# Patient Record
Sex: FEMALE | Race: AMERICAN INDIAN OR ALASKA NATIVE
[De-identification: names, ages, dates, MRNs, and addresses within clinical notes are randomized per-mention and may not be internally consistent; named-entity substitution may affect disease eponyms.]

---

## 2018-06-23 ENCOUNTER — HOSPITAL ENCOUNTER (INPATIENT)
Dept: HOSPITAL 42 - ED | Age: 43
LOS: 3 days | Discharge: HOME | DRG: 392 | End: 2018-06-26
Attending: FAMILY MEDICINE | Admitting: FAMILY MEDICINE
Payer: COMMERCIAL

## 2018-06-23 VITALS — BODY MASS INDEX: 36.6 KG/M2

## 2018-06-23 DIAGNOSIS — K58.9: ICD-10-CM

## 2018-06-23 DIAGNOSIS — D64.9: ICD-10-CM

## 2018-06-23 DIAGNOSIS — J45.901: ICD-10-CM

## 2018-06-23 DIAGNOSIS — E86.0: ICD-10-CM

## 2018-06-23 DIAGNOSIS — F32.9: ICD-10-CM

## 2018-06-23 DIAGNOSIS — K21.9: ICD-10-CM

## 2018-06-23 DIAGNOSIS — A08.4: Primary | ICD-10-CM

## 2018-06-23 DIAGNOSIS — F41.9: ICD-10-CM

## 2018-06-23 DIAGNOSIS — F17.210: ICD-10-CM

## 2018-06-23 DIAGNOSIS — J44.9: ICD-10-CM

## 2018-06-23 LAB
ALBUMIN SERPL-MCNC: 4.2 G/DL (ref 3–4.8)
ALBUMIN/GLOB SERPL: 1.3 {RATIO} (ref 1.1–1.8)
ALT SERPL-CCNC: 37 U/L (ref 7–56)
APPEARANCE UR: (no result)
APTT BLD: 26.9 SECONDS (ref 25.1–36.5)
AST SERPL-CCNC: 27 U/L (ref 14–36)
BACTERIA #/AREA URNS HPF: (no result) /[HPF]
BASE EXCESS BLDV CALC-SCNC: 1.2 MMOL/L (ref 0–2)
BASOPHILS # BLD AUTO: 0 K/MM3 (ref 0–2)
BASOPHILS NFR BLD: 0 % (ref 0–3)
BILIRUB UR-MCNC: NEGATIVE MG/DL
BUN SERPL-MCNC: 16 MG/DL (ref 7–21)
CALCIUM SERPL-MCNC: 9 MG/DL (ref 8.4–10.5)
COLOR UR: YELLOW
EOSINOPHIL # BLD: 0 10*3/UL (ref 0–0.7)
EOSINOPHIL NFR BLD: 0 % (ref 1.5–5)
ERYTHROCYTE [DISTWIDTH] IN BLOOD BY AUTOMATED COUNT: 16.4 % (ref 11.5–14.5)
GFR NON-AFRICAN AMERICAN: > 60
GLUCOSE UR STRIP-MCNC: NEGATIVE MG/DL
GRANULOCYTES # BLD: 7.34 10*3/UL (ref 1.4–6.5)
GRANULOCYTES NFR BLD: 79.5 % (ref 50–68)
HGB BLD-MCNC: 10.3 G/DL (ref 12–16)
INR PPP: 1.09 (ref 0.93–1.08)
LEUKOCYTE ESTERASE UR-ACNC: NEGATIVE LEU/UL
LIPASE SERPL-CCNC: 84 U/L (ref 23–300)
LYMPHOCYTES # BLD: 1.2 10*3/UL (ref 1.2–3.4)
LYMPHOCYTES NFR BLD AUTO: 13.2 % (ref 22–35)
MCH RBC QN AUTO: 24.4 PG (ref 25–35)
MCHC RBC AUTO-ENTMCNC: 31.5 G/DL (ref 31–37)
MCV RBC AUTO: 77.5 FL (ref 80–105)
MONOCYTES # BLD AUTO: 0.7 10*3/UL (ref 0.1–0.6)
MONOCYTES NFR BLD: 7.3 % (ref 1–6)
PH BLDV: 7.39 [PH] (ref 7.32–7.43)
PH UR STRIP: 6 [PH] (ref 4.7–8)
PLATELET # BLD: 341 10^3/UL (ref 120–450)
PMV BLD AUTO: 8.4 FL (ref 7–11)
PROT UR STRIP-MCNC: 30 MG/DL
PROTHROMBIN TIME: 12.4 SECONDS (ref 9.4–12.5)
RBC # BLD AUTO: 4.22 10^6/UL (ref 3.5–6.1)
RBC # UR STRIP: (no result) /UL
SP GR UR STRIP: >= 1.03 (ref 1–1.03)
TROPONIN I SERPL-MCNC: < 0.01 NG/ML
UROBILINOGEN UR STRIP-ACNC: 0.2 E.U./DL
VENOUS BLOOD FIO2: 21 %
VENOUS BLOOD GAS PCO2: 44 (ref 40–60)
VENOUS BLOOD GAS PO2: 80 MM/HG (ref 30–55)
WBC # BLD AUTO: 9.2 10^3/UL (ref 4.5–11)

## 2018-06-23 NOTE — ED PDOC
Arrival/HPI





- General


Chief Complaint: Abdominal Pain


Time Seen by Provider: 06/23/18 16:34


Historian: Patient, EMS





- History of Present Illness


Narrative History of Present Illness (Text): 





06/23/18 16:35


pt p/w 2days onset of not feeling well, started with 5 episodes of vomiting 

yesterday, with mid-lower abd cramps, pt presented herself to her local ER down 

in [place]. Pt was evaluated and treated and discharged home; pt was due 

for a cruise today and while in the boat, pt continue to feel malaise/weak/

generalized fatigue; pt states she has a hard time lifting her arms and legs; 

pt presented her self to the [facility] and was started on fluids and was 

about to be started on abx but was brought to emergency department for further 

eval before the abx could be given; pt states ? subjective fever, chills, no 

sweats, no chest pain/shortness of breath/palpitations, no abd pain/cramps today

, + diarrhea x 1-2 episodes yesterday, NON-bloody, no urinary changes, no fall/

trauma/sick contact; pt is here for further eval. pt's without other complaints.


pt denied LOC


pt felt mild dizziness/lightheadedness, mild headache








PCP: out of state, [place]/sa[place] [facility]


Pt with hx of asthma; IBS (flares occurring every few months)





Time/Duration: < week (2 days)


Symptom Onset: Sudden


Symptom Course: Unchanged


Activities at Onset: Rest


Context: Other (on cruise ship)





Past Medical History





- Provider Review


Nursing Documentation Reviewed: Yes





- Travel History


Have you recently traveled outside [place] w/in the past 3 mons?: No





- Past History


Past History: Non-Contributing





- Infectious Disease


Hx of Infectious Diseases: None





- Reproductive


Currently Pregnant: Unknown





- Cardiac


Hx Cardiac Disorders: No





- Pulmonary


Hx Respiratory Disorders: Yes


Hx Asthma: Yes





- Neurological


Hx Neurological Disorder: No





- HEENT


Hx HEENT Disorder: No





- Renal


Hx Renal Disorder: No





- Endocrine/Metabolic


Hx Endocrine Disorders: No





- Hematological/Oncological


Hx Blood Disorders: No





- Integumentary


Hx Dermatological Disorder: No





- Musculoskeletal/Rheumatological


Hx Musculoskeletal Disorders: No





- Gastrointestinal


Hx Gastrointestinal Disorders: Yes


Hx Gastroesophageal Reflux: Yes





- Genitourinary/Gynecological


Hx Genitourinary Disorders: No





- Psychiatric


Hx Psychophysiologic Disorder: Yes


Hx Depression: Yes


Hx Substance Use: No





- Surgical History


Hx Tubal Ligation: Yes





Family/Social History





- Physician Review


Nursing Documentation Reviewed: Yes


Family/Social History: No Known Family HX


Smoking Status: Current Some Days Smoker


Hx Alcohol Use: Yes


Frequency of alcohol use: Socially


Hx Substance Use: No


Hx Substance Use Treatment: No





Allergies/Home Meds


Allergies/Adverse Reactions: 


Allergies





No Known Allergies Allergy (Verified 06/23/18 16:15)


 








Home Medications: 


 Home Meds











 Medication  Instructions  Recorded  Confirmed


 


Albuterol HFA [Ventolin HFA 90 2 puff NEB Q6 PRN 06/23/18 06/23/18





mcg/actuation (8 g)]   


 


Dicyclomine [Bentyl] 20 mg PO DAILY 06/23/18 06/23/18


 


Pantoprazole Sodium [Protonix] 40 mg PO DAILY 06/23/18 06/23/18














Review of Systems





- Review of Systems


Constitutional: Fatigue, Fevers


Eyes: Normal


ENT: Normal.  absent: Sore Throat


Respiratory: Normal.  absent: SOB


Cardiovascular: Normal.  absent: Chest Pain


Gastrointestinal: Abdominal Pain, Diarrhea, Nausea, Vomiting, Appetite Changes


Genitourinary Female: Normal


Musculoskeletal: Normal


Skin: Normal


Neurological: Dizziness.  absent: Headache


Endocrine: Normal


Hemo/Lymphatic: Normal


Psychiatric: Normal





Physical Exam





- Physical Exam


Narrative Physical Exam (Text): 





06/23/18 16:40


General: alert/awake, GCS = 15, oriented x 3, resting in bed, uncomfortable, 

cooperative, interactive; NAD; very fatigued/weak appearing


Head: NC/AT


EYE: PERRLA, EOMI, sclera anicteric, no nystagmus, no photophobia; visual field 

intact b/l


Facial: WNL


Oral: uvula/tongue are midline, no exudate/lesions, no drooling/stridor, no 

dysphonia; fair dentitions; DRY oral mucosa


NECK: intact ROM, no midline tenderness, no nuchal rigidity, no meningeal signs

; no step off


Chest: CTA b/l, no w/r/r; no tachypenia, no accessory muscle use noted


Cardiac: +S1, +S2, no m/r/r, no tachycardia


Abdominal: +BS, soft/nd/nt, well nourished/morbid obese patient; no masses/

rebound/guarding/rigidity; no joshua's sign, no mcburney's point tenderness


Extremities: intact ROM, strength 5/5 grossly intact in all limbs, neurovasc 

intact b/l; + ambulatory; reflex +2/2; no pitting edema/swelling b/l; no Maico'

s sign b/l


BACK: no step off, no midline tenderness, NO crepitus, no gross deformities 

noted; Intact ROM


SKIN: cap refill ~ 1-2 sec, no ulcerations, no petechiae, no rashes; no gross 

pallor


NEURO: CNII-XII WNL, no facial asymmetries, no slurr speech, oriented x 3


NIH stroke scale ~ 0


Psych: normal insight, flat affect; follows command with ease








Vital Signs Reviewed: Yes


Vital Signs











  Temp Pulse Resp BP Pulse Ox


 


 06/23/18 21:05  99.0 F  86  18  118/75  96


 


 06/23/18 17:32   71  18  125/65  99


 


 06/23/18 16:16  99.9 F H  80  17  153/87 H  97











Temperature: Febrile


Blood Pressure: Hypertensive


Pulse: Regular


Respiratory Rate: Normal


Appearance: Positive for: Well-Appearing, Ill-Appearing, Uncomfortable.  No: Non

-Toxic, Comfortable, Unkept


Pain Distress: None


Mental Status: Positive for: Alert and Oriented X 3





- Systems Exam


Head: Present: Atraumatic, Normocephalic





Medical Decision Making


ED Course and Treatment: 





06/23/18 16:40


Impression: general weakness, n/v/d


i have consider all the differential diagnosis regarding pt's chief medical 

complaints/clinical findings, including but are not limited to: r/o infection/

bacteremia, n/v/d, weakness





A/P: general weakness, n/v/d


- labs


- cultures


- iv


- xray


- ct


- supportive care


- observe/reevaluation





1750


i was notified that nurses have a hard time obtaining blood tests/IV placement





i was able to place a 20g IV to right EJ





06/23/18 1900


pt is currently stable, still feeling very weak and tired/fatigued


pt is awaiting her ct results





pending final disposition





2050


pt + vomited


pt continue to have body malaise and weakness/fatigue


pt complains of right neck pain due to IV placement





due to pt's continued malaise/weakness complaint, and vomiting, possible IBS 

flare, will recommend patient for admission





paging Dr Castaneda





06/23/18 2100


I spoke to Dr Castaneda, made aware, agrees with admission/observation





pt is made aware of her medical results


pt agrees with admission





pt states she feels jittery/anxious at the moment





Re-evaluation Time: 19:00


Reassessment Condition: Improving,but remains with symptoms





- Lab Interpretations


Lab Results: 








 06/23/18 18:10 





 06/23/18 18:10 





 Lab Results





06/23/18 18:10: pO2 80 H, VBG pH 7.39, VBG pCO2 44.0, VBG HCO3 26.6, VBG Total 

CO2 28.0, VBG O2 Sat (Calc) 98.0 H, VBG Base Excess 1.2, VBG Potassium 3.6, 

Sodium 136.0, Chloride 103.0, Glucose 102, Lactate 0.9, FiO2 21.0, Venous Blood 

Potassium 3.6


06/23/18 18:10: Sodium 140, Chloride 101, Potassium 3.7, Carbon Dioxide 26, 

Anion Gap 16, BUN 16, Creatinine 0.7, Est GFR (African Amer) > 60, Est GFR (Non-

Af Amer) > 60, Random Glucose 100, Calcium 9.0, Magnesium 2.0, Total Bilirubin 

0.3, AST 27, ALT 37, Alkaline Phosphatase 60, Troponin I < 0.01, Total Protein 

7.5, Albumin 4.2, Globulin 3.3, Albumin/Globulin Ratio 1.3, Lipase 84


06/23/18 18:10: PT 12.4, INR 1.09 H, APTT 26.9


06/23/18 18:10: WBC 9.2, RBC 4.22, Hgb 10.3 L, Hct 32.7 L, MCV 77.5 L, MCH 24.4 

L, MCHC 31.5, RDW 16.4 H, Plt Count 341, MPV 8.4, Gran % 79.5 H, Lymph % (Auto) 

13.2 L, Mono % (Auto) 7.3 H, Eos % (Auto) 0.0 L, Baso % (Auto) 0.0, Gran # 7.34 

H, Lymph # (Auto) 1.2, Mono # (Auto) 0.7 H, Eos # (Auto) 0.0, Baso # (Auto) 0.00


06/23/18 17:00: Urine Color Yellow, Urine Appearance Sl cloudy, Urine pH 6.0, 

Ur Specific Gravity >= 1.030, Urine Protein 30 H, Urine Glucose (UA) Negative, 

Urine Ketones 40 H, Urine Blood Large H, Urine Nitrate Negative, Urine 

Bilirubin Negative, Urine Urobilinogen 0.2, Ur Leukocyte Esterase Negative, 

Urine RBC 5 - 10, Urine WBC 2 - 5, Ur Epithelial Cells 4 - 5, Urine Bacteria Few








I have reviewed the lab results: Yes


Interpretation: Abnormal lab values (all labs WNL, abnl Urinalysis - likely 

menstrual cycle)





- RAD Interpretation


Narrative RAD Interpretations (Text): 





06/23/2018 19:58


Abd/Pelvis CT


IMPRESSION: Small fat containing umbilical hernia.


Dictator: Giulia Puente MD





06/23/18 20:56


Chest X-Ray - NAD, prelim result


Radiology Orders: 








06/23/18 16:35


ABD & PELVIS IV CONTRAST ONLY [CT] Stat 





06/23/18 16:45


CHEST TWO VIEWS (PA/LAT) [RAD] Stat 











: ED Physician, Radiologist





- EKG Interpretation


EKG Interpretation (Text): 





06/23/18 20:57


NSR at 85 bpm, normal axis, no ectopy, right atrial enlargement, no st-t changes

, BORDERLINE EKG; no old ekg to compare with





Interpreted by ED Physician: Yes


Type: 12 lead EKG


Comparison: No previous EKG avail.





- Medication Orders


Current Medication Orders: 








Diphenhydramine HCl (Benadryl)  25 mg IVP STAT STA


   Stop: 06/23/18 21:15


Sodium Chloride (Sodium Chloride 0.45%)  1,000 mls @ 80 mls/hr IV .[id number] ASHLIE


Lorazepam (Ativan)  1 mg IVP ONCE ONE


   PRN Reason: Protocol


   Stop: 06/23/18 21:15





Discontinued Medications





Acetaminophen (Tylenol 325mg Tab)  650 mg PO STAT STA


   Stop: 06/23/18 16:49


   Last Admin: 06/23/18 17:14  Dose: 650 mg





Sodium Chloride (Sodium Chloride 0.9%)  1,000 mls @ 1,000 mls/hr IV .Q1H STA


   Stop: 06/23/18 17:33


   Last Admin: 06/23/18 16:58  Dose: 1,000 mls/hr





eMAR Start Stop


 Document     06/23/18 16:58  SF  (Rec: 06/23/18 16:58  SF  [facility])


     Intravenous Solution


      Start Date                                 06/23/18


      Start Time                                 16:58


      End Date                                   06/23/18


      End time                                   17:58


      Total Infusion Time                        60





Morphine Sulfate (Morphine)  4 mg IVP STAT STA


   Stop: 06/23/18 20:55


Ondansetron HCl (Zofran Inj)  4 mg IVP STAT STA


   Stop: 06/23/18 16:35


   Last Admin: 06/23/18 16:58  Dose: 4 mg





IVP Administration


 Document     06/23/18 16:58  SF  (Rec: 06/23/18 16:58  SF  [facility])


     Charges for Administration


      # of IVP Administrations                   1





Ondansetron HCl (Zofran Inj)  4 mg IVP STAT STA


   Stop: 06/23/18 21:04


Pantoprazole Sodium (Protonix Inj)  40 mg IVP STAT STA


   Stop: 06/23/18 16:35


   Last Admin: 06/23/18 17:14  Dose: 40 mg





IVP Administration


 Document     06/23/18 17:14  SF  (Rec: 06/23/18 17:14  SF  [facility])


     Charges for Administration


      # of IVP Administrations                   1














Disposition/Present on Arrival





- Present on Arrival


Any Indicators Present on Arrival: No


History of DVT/PE: No


History of Uncontrolled Diabetes: No


Urinary Catheter: No


History of Decub. Ulcer: No


History Surgical Site Infection Following: None





- Disposition


Have Diagnosis and Disposition been Completed?: Yes


Diagnosis: 


 Intractable nausea and vomiting, Dehydration, Weakness, Diarrhea





Disposition: HOSPITALIZED


Disposition Time: 21:00


Patient Plan: Admission, Observation


Condition: STABLE


Discharge Instructions (ExitCare):  Weakness (ED)


Print Language: ENGLISH


Referrals: 


PCP,NO [Primary Care Provider] - Follow up with primary


Forms:  [employer] (English)

## 2018-06-24 VITALS — RESPIRATION RATE: 20 BRPM

## 2018-06-24 LAB
ALBUMIN SERPL-MCNC: 3.6 G/DL (ref 3–4.8)
ALBUMIN/GLOB SERPL: 1.2 {RATIO} (ref 1.1–1.8)
ALT SERPL-CCNC: 32 U/L (ref 7–56)
ARTERIAL BLOOD GAS O2 SAT: 97.6 % (ref 95–98)
ARTERIAL BLOOD GAS PCO2: 57 MM/HG (ref 35–45)
ARTERIAL BLOOD GAS TCO2: 26.7 MMOL.L (ref 22–28)
AST SERPL-CCNC: 25 U/L (ref 14–36)
BASOPHILS # BLD AUTO: 0.01 K/MM3 (ref 0–2)
BASOPHILS NFR BLD: 0.1 % (ref 0–3)
BUN SERPL-MCNC: 15 MG/DL (ref 7–21)
CALCIUM SERPL-MCNC: 8.3 MG/DL (ref 8.4–10.5)
EOSINOPHIL # BLD: 0 10*3/UL (ref 0–0.7)
EOSINOPHIL NFR BLD: 0.3 % (ref 1.5–5)
ERYTHROCYTE [DISTWIDTH] IN BLOOD BY AUTOMATED COUNT: 16.5 % (ref 11.5–14.5)
GFR NON-AFRICAN AMERICAN: > 60
GRANULOCYTES # BLD: 4.4 10*3/UL (ref 1.4–6.5)
GRANULOCYTES NFR BLD: 61.4 % (ref 50–68)
HCO3 BLDA-SCNC: 25 MMOL/L (ref 21–28)
HGB BLD-MCNC: 9.5 G/DL (ref 12–16)
INHALED O2 CONCENTRATION: 35 %
LYMPHOCYTES # BLD: 2.3 10*3/UL (ref 1.2–3.4)
LYMPHOCYTES NFR BLD AUTO: 32.6 % (ref 22–35)
MCH RBC QN AUTO: 24 PG (ref 25–35)
MCHC RBC AUTO-ENTMCNC: 30.5 G/DL (ref 31–37)
MCV RBC AUTO: 78.5 FL (ref 80–105)
MONOCYTES # BLD AUTO: 0.4 10*3/UL (ref 0.1–0.6)
MONOCYTES NFR BLD: 5.6 % (ref 1–6)
PH BLDA: 7.25 [PH] (ref 7.35–7.45)
PLATELET # BLD: 327 10^3/UL (ref 120–450)
PMV BLD AUTO: 8.4 FL (ref 7–11)
PO2 BLDA: 82 MM/HG (ref 80–100)
RBC # BLD AUTO: 3.96 10^6/UL (ref 3.5–6.1)
TROPONIN I SERPL-MCNC: < 0.01 NG/ML
WBC # BLD AUTO: 7.2 10^3/UL (ref 4.5–11)

## 2018-06-24 RX ADMIN — LEVALBUTEROL SCH: 0.63 SOLUTION RESPIRATORY (INHALATION) at 16:00

## 2018-06-24 RX ADMIN — MORPHINE SULFATE PRN MG: 2 INJECTION, SOLUTION INTRAMUSCULAR; INTRAVENOUS at 10:14

## 2018-06-24 RX ADMIN — MORPHINE SULFATE PRN MG: 2 INJECTION, SOLUTION INTRAMUSCULAR; INTRAVENOUS at 15:00

## 2018-06-24 RX ADMIN — METHYLPREDNISOLONE SODIUM SUCCINATE SCH MG: 40 INJECTION, POWDER, FOR SOLUTION INTRAMUSCULAR; INTRAVENOUS at 22:02

## 2018-06-24 RX ADMIN — METHYLPREDNISOLONE SODIUM SUCCINATE SCH MG: 40 INJECTION, POWDER, FOR SOLUTION INTRAMUSCULAR; INTRAVENOUS at 14:31

## 2018-06-24 RX ADMIN — LEVALBUTEROL SCH MG: 0.63 SOLUTION RESPIRATORY (INHALATION) at 11:03

## 2018-06-24 RX ADMIN — LEVALBUTEROL SCH MG: 0.63 SOLUTION RESPIRATORY (INHALATION) at 20:55

## 2018-06-24 NOTE — RAD
HISTORY:

SOB RAPIDRES  



COMPARISON:

No comparison chest 06/23/2018 



FINDINGS:



LUNGS:

No active pulmonary disease.



PLEURA:

No significant pleural effusion identified, no pneumothorax apparent.



CARDIOVASCULAR:

Normal.



OSSEOUS STRUCTURES:

No significant abnormalities.



VISUALIZED UPPER ABDOMEN:

Normal.



OTHER FINDINGS:

None.



IMPRESSION:

No active disease.

## 2018-06-24 NOTE — PCM.RRT
<Li Soto - Last Filed: 06/24/18 10:54>





RRT Nurse Assessment





- Situation


Date: 06/24/18


Time RRT was called: 10:27


RRT Responder Arrival Time: 10:30


RRT Location:: [address] [place] [address]


Room Number: 573-3


RRT Reason for Call: Respiratory Distress


RRT Called By: RN





- IV


IV Inserted during RRT?: No





- Respiratory


Oxygen Delivery Method: Room Air


Received Nebulizer Treatments:: Yes





- Medication


Medications Administered During RRT: SoluMedrol 125mg IVP, Duoneb 3ml inh x 2





- Diagnostic Test Ordered


EKG: Yes


Chest X-Ray: Yes


CT Scan: No


Other Diagnostic Test Ordered: ABG SHOCK, DRUG SCREEN, ALCOHOL, CARDIO, DDIMER





- Stat Labs Ordered


RRT Stat Labs Ordered: CBC, BMP, TROPONIN, LACTIC ACID, ABG


CPR started during RRT?: No





- Vital Signs


Vital Sign: 


Rapid Response Vital Sign





Blood Pressure                   116/63


Pulse Rate                       125


Oxygen Saturation                88











- Finger Stick Blood Glucose


Finger Stick Blood Glucose: 114





- Time RRT Ended


Time RRT Ended: 10:50





- Vital Signs at end of RRT


Vital Signs at end of RRT: 


Rapid Response End Vital Sign





Blood Pressure                   131/84


Pulse Rate                       120


Respiratory Rate                 20


O2 Sat by Pulse Oximetry         95











- Recommendations


Notifications: Attending Physician





- Neurological Status


(Select all that apply): Alert





- Respiratory


Oxygen Delivery Method: Nasal Cannula @L/min (3.5L)





- Constitutional


Appears: In Acute Distress (SOB)





- Head


Head Exam: ATRAUMATIC, NORMAL INSPECTION, NORMOCEPHALIC





- Eyes


Eye Exam: EOMI, Normal appearance, PERRL.  absent: Conjunctival injection, 

Scleral icterus





- Respiratory Exam


Respiratory Exam: Wheezes, Respiratory Distress.  absent: Accessory Muscle Use, 

Rales, Rhonchi





- Cardiovascular Exam


Cardiovascular Exam: Tachycardia, +S1, +S2.  absent: Murmur





- GI/Abdominal Exam


GI & Abdominal Exam: Soft





- Neurological Exam


Neurological Exam: Alert, Awake, Oriented x3





- Extremities Exam


Extremities Exam: Normal Inspection.  absent: Calf Tenderness, Pedal Edema





Plan





- Assessment of Findings&Treatment Plan





RRT called as patient was acutely SOB this AM. House doc, attending physicians, 

and residents on call responded immediately.


Patient was sitting up in bed complaining of shortness of breath and audibly 

wheezing.


Patient was given 2 stat treatments of Duoneb and given 125mg solumedrol. STAT 

labs and CXR were ordered.


Patient's ABG was evidnet of respiratory acidosis.


PMD Dr. Castaneda was contacted who ordered Xopenex q4 and Solumedrol 40 q8.


Pulm Dr. Gomez consulted.


Vitals at end of RRT: /84  O2 95%. Patient was also complaining of 

anxiety and was given one dose of 0.25mg xanax.








<Sudheer Pratt - Last Filed: 06/24/18 13:39>





RRT Nurse Assessment





- Vital Signs


Vital Sign: 


Rapid Response Vital Sign





Blood Pressure                   116/63


Pulse Rate                       125


Oxygen Saturation                88











- Vital Signs at end of RRT


Vital Signs at end of RRT: 


Rapid Response End Vital Sign





Blood Pressure                   131/84


Pulse Rate                       120


Respiratory Rate                 20


O2 Sat by Pulse Oximetry         95











Attending/Attestation





- Attestation


I have personally seen and examined this patient.: Yes


I have fully participated in the care of the patient.: Yes


I have reviewed all pertinent clinical information, including history, physical 

exam and plan: Yes


Notes (Text): 





06/24/18 13:34


RRT called for shortness of breath.


+Wheezing and tachycardia on exam.


.  O2 on RA 88.


CXR/ABG ordered.  Xopenex and steroids.


D-dimer also ordered.


PMD notified.  Pulmonary evaluation was requested.


Discharge is held for now.

## 2018-06-24 NOTE — RAD
HISTORY:

n/v/d, r/o bacteremia  



COMPARISON:

No prior.



TECHNIQUE:

Chest PA and lateral



FINDINGS:



LUNGS:

No active pulmonary disease.



PLEURA:

No significant pleural effusion identified. No pneumothorax apparent.



CARDIOVASCULAR:

Normal.



OSSEOUS STRUCTURES:

No significant abnormalities.



VISUALIZED UPPER ABDOMEN:

Normal.



OTHER FINDINGS:

None.



IMPRESSION:

No active disease.

## 2018-06-24 NOTE — HP
HISTORY OF PRESENT ILLNESS:  I was called to the ER to see Sima.  She was

on a cruise ship.  She had five episodes of vomiting the other day,

abdominal cramps.  She went to the emergency room and felt drowsier.  She

was treated, discharged home, was supposed to be on a cruise ship today,

got very fatigued that happened again.  She got very tired of even lifting

her luggage.  She started to go on board and could not do it.  She ended up

in our emergency room with diarrhea and episodes of nausea, vomiting, and

cramping, nonbloody and  we kept her overnight in observation status,

as our plan with IV fluids, medications and n.p.o. because she is throwing

up and antianxiety medication.



PAST MEDICAL HISTORY:  Asthma, irritable bowel syndrome, and flare-ups

every few months.  This is a sudden onset of this.  She has

gastroesophageal reflux.  She also has a history of depression.  She has a

history of tubal ligation.



FAMILY HISTORY:  No known family history.



SOCIAL HISTORY:  She still smokes cigarettes.  Occasional alcohol.  No

substance abuse.



ALLERGIES:  NO KNOWN DRUG ALLERGY.



MEDICATIONS:  She is on albuterol as needed, Bentyl, and Protonix as

needed.



REVIEW OF SYSTEMS:  She is very tired as she had fevers.  No vision

changes, no hearing changes, no sore throat, no shortness of breath, no

cough, no chest pain, no palpitations.  She has abdominal pain.  She had

diarrhea.  She had nausea and vomiting.  She has appetite changes,

abdominal cramping.  No problems urinating.  For the most part of skin is

intact, she says no rashes or ulcers.  She is a little bit dizzy from all

the throwing up, but no headache.  No anxiety or depression.



PHYSICAL EXAMINATION:

GENERAL:  She has a 99 temperature, 80 pulse, 17 respiratory rate, 153/87

blood pressure, and 97% O2 sat on room air.

GENERAL:  She is alert and oriented x3.  Cranial nerves II through XII

grossly intact.

HEENT:  Head is atraumatic and normocephalic.  Extraocular muscles are

intact.  Pupils are equally reactive to light and accommodation.  Throat is

dry.

NECK:  Supple.

HEART:  Regular rate.  Normal S1 and S2.

LUNGS:  Decreased breath sounds, but clear to auscultation.  No wheezing,

no rhonchi, no rales.

ABDOMEN:  Mildly distended.  There are bowel sounds, but they are high

pitched and distant, but no guarding, no rebound, a little bit discomfort

from cramping.

EXTREMITIES:  No edema.  Good range of motion.

NEUROLOGIC:  Cranial nerves II through XII grossly intact.  Normal speech. 

Normal neurological exam.  Normal insight and understands what is going on.



She is here for intractable nausea, vomiting, weakness, history of IBS with

abdominal cramping.  For nausea and vomiting, she will be put in for viral

gastroenteritis type of picture, IV fluids, n.p.o., Protonix.  We are going

to watch her overnight and see how she does and if she does well, we will

discharge her.







__________________________________________

Navneet Castaneda DO



DD:  06/24/2018 8:23:16

DT:  06/24/2018 17:36:05

Job # [id number]

MTDD

## 2018-06-24 NOTE — CARD
--------------- APPROVED REPORT --------------





EKG Measurement

Heart [id number]

NM [id number]

[id number]

[id number]

[id number]



<Conclusion>

Normal sinus rhythm

Right atrial enlargement

Borderline ECG

## 2018-06-24 NOTE — CARD
--------------- APPROVED REPORT --------------





EKG Measurement

Heart [id number]

LA [id number]

[id number]

[id number]

[id number]



<Conclusion>

Sinus tachycardia

Otherwise normal ECG

## 2018-06-24 NOTE — CP.PCM.CON
<Pelon Cameron - Last Filed: 06/24/18 10:57>





History of Present Illness





- History of Present Illness


History of Present Illness: 





PGY5 GI Fellow Consult Note


Patient is a 41yo female with PMHx significant for asthma and IBS who presented 

to the ED from a cruise ship for nausea, vomiting and abdominal pain. The 

patient lives outside of [place] and is established with providers in that 

area. She had not been feeling well since Friday (2 days PTA) with diffuse 

abdominal cramping, nausea/vomiting and was seen and evaluated at a hospital in 

[place] where she states she was given Haldol and Ativan (medications per 

patient with no known psych history) and discharged home. She felt very 

fatigued for the next day and slept on and off for several hours. Thinking she 

was well enough to go on a cruise, she drove to [place] and boarded her ship. 

On board, she felt ill and went to the [facility] who recommended patient be 

evaluated at our facility. Denies any fever, chills, diarrhea, weight loss, 

rectal bleeding, dysuria. 





12 system ROS performed and negative except where stated.





PMHx: See HPI


PSHx: tubal ligation


FHx: Discussed with patient and she denies any significant family history


Social: Denies tobacco, EtOH or illicit drug use


Endo:  Colonoscopy 3 years ago - unremarkable per patient; EGD in past year - 

unremarkable per patient





Past Patient History





- Infectious Disease


Hx of Infectious Diseases: None





- Past Social History


Smoking Status: Current Some Days Smoker





- CARDIAC


Hx Cardiac Disorders: No





- PULMONARY


Hx Respiratory Disorders: Yes


Hx Asthma: Yes





- NEUROLOGICAL


Hx Neurological Disorder: No





- HEENT


Hx HEENT Problems: No





- RENAL


Hx Chronic Kidney Disease: No





- ENDOCRINE/METABOLIC


Hx Endocrine Disorders: No





- HEMATOLOGICAL/ONCOLOGICAL


Hx Blood Disorders: No





- INTEGUMENTARY


Hx Dermatological Problems: No





- MUSCULOSKELETAL/RHEUMATOLOGICAL


Hx Musculoskeletal Disorders: No


Hx Falls: No





- GASTROINTESTINAL


Hx Gastrointestinal Disorders: Yes


Hx Gastroesophageal Reflux: Yes





- GENITOURINARY/GYNECOLOGICAL


Hx Genitourinary Disorders: No





- PSYCHIATRIC


Hx Psychophysiologic Disorder: Yes


Hx Depression: Yes


Hx Substance Use: No





- SURGICAL HISTORY


Hx Surgeries: Yes





Meds


Allergies/Adverse Reactions: 


 Allergies











Allergy/AdvReac Type Severity Reaction Status Date / Time


 


No Known Allergies Allergy   Verified 06/23/18 16:15














- Medications


Medications: 


 Current Medications





Sodium Chloride (Sodium Chloride 0.45%)  1,000 mls @ 80 mls/hr IV .[id number] [facility]


   Last Admin: 06/23/18 22:24 Dose:  80 mls/hr


Ketorolac Tromethamine (Toradol)  30 mg IVP Q6 PRN


   PRN Reason: Pain, moderate (4-7)


   Last Admin: 06/24/18 09:10 Dose:  30 mg


Levalbuterol HCl (Xopenex)  0.63 mg IH [id number] [facility]


Methylprednisolone (Solu-Medrol)  40 mg IVP Q8 [facility]


Morphine Sulfate (Morphine)  1 mg IVP Q4H PRN


   PRN Reason: Pain, moderate (4-7)


   Last Admin: 06/24/18 10:14 Dose:  1 mg


Pantoprazole Sodium (Protonix Inj)  40 mg IVP DAILY [facility]


   Last Admin: 06/24/18 10:15 Dose:  40 mg











Physical Exam





- Constitutional


Appears: Non-toxic, No Acute Distress


Additional comments: 





obese





- Eye Exam


Eye Exam: EOMI, PERRL





- ENT Exam


ENT Exam: Mucous Membranes Moist





- Respiratory Exam


Respiratory Exam: Clear to Auscultation Bilateral.  absent: Rales, Rhonchi, 

Wheezes





- Cardiovascular Exam


Cardiovascular Exam: RRR, +S1, +S2





- GI/Abdominal Exam


GI & Abdominal Exam: Normal Bowel Sounds, Soft.  absent: Distended, Firm, 

Guarding, Mass, Organomegaly, Rigid, Tenderness





- Extremities Exam


Extremities exam: Positive for: normal inspection.  Negative for: pedal edema





- Neurological Exam


Neurological exam: Alert, Oriented x3





- Psychiatric Exam


Psychiatric exam: Normal Affect, Normal Mood





- Skin


Skin Exam: Dry, Warm





Results





- Vital Signs


Recent Vital Signs: 


 Last Vital Signs











Temp  98.4 F   06/24/18 06:00


 


Pulse  84   06/24/18 06:00


 


Resp  20   06/24/18 06:00


 


BP  113/64   06/24/18 06:00


 


Pulse Ox  96   06/24/18 06:00














- Labs


Result Diagrams: 


 06/24/18 06:00





 06/24/18 06:00


Labs: 


 Laboratory Results - last 24 hr











  06/24/18 06/24/18 06/24/18





  06:00 06:00 10:31


 


WBC  7.2  D  


 


RBC  3.96  


 


Hgb  9.5 L  


 


Hct  31.1 L  


 


MCV  78.5 L  


 


MCH  24.0 L  


 


MCHC  30.5 L  


 


RDW  16.5 H  


 


Plt Count  327  


 


MPV  8.4  


 


Gran %  61.4  


 


Lymph % (Auto)  32.6  


 


Mono % (Auto)  5.6  


 


Eos % (Auto)  0.3 L  


 


Baso % (Auto)  0.1  


 


Gran #  4.40  


 


Lymph # (Auto)  2.3  


 


Mono # (Auto)  0.4  


 


Eos # (Auto)  0.0  


 


Baso # (Auto)  0.01  


 


pCO2   


 


pO2   


 


HCO3   


 


ABG pH   


 


ABG Total CO2   


 


ABG O2 Saturation   


 


ABG Base Excess   


 


ABG Potassium   


 


Glucose   


 


Lactate   


 


FiO2   


 


Sodium   140 


 


Potassium   3.6 


 


Chloride   104 


 


Carbon Dioxide   27 


 


Anion Gap   13 


 


BUN   15 


 


Creatinine   0.7 


 


Est GFR ( Amer)   > 60 


 


Est GFR (Non-Af Amer)   > 60 


 


POC Glucose (mg/dL)    114 H


 


Random Glucose   85 


 


Calcium   8.3 L 


 


Total Bilirubin   0.3 


 


AST   25 


 


ALT   32 


 


Alkaline Phosphatase   46 


 


Total Protein   6.5 


 


Albumin   3.6 


 


Globulin   2.9 


 


Albumin/Globulin Ratio   1.2 


 


Arterial Blood Potassium   














  06/24/18





  10:40


 


WBC 


 


RBC 


 


Hgb 


 


Hct 


 


MCV 


 


MCH 


 


MCHC 


 


RDW 


 


Plt Count 


 


MPV 


 


Gran % 


 


Lymph % (Auto) 


 


Mono % (Auto) 


 


Eos % (Auto) 


 


Baso % (Auto) 


 


Gran # 


 


Lymph # (Auto) 


 


Mono # (Auto) 


 


Eos # (Auto) 


 


Baso # (Auto) 


 


pCO2  57 H


 


pO2  82.0


 


HCO3  25.0


 


ABG pH  7.25 L


 


ABG Total CO2  26.7


 


ABG O2 Saturation  97.6


 


ABG Base Excess  -3.1 L


 


ABG Potassium  3.0 L


 


Glucose  125 H


 


Lactate  0.9


 


FiO2  35.0


 


Sodium  142.0


 


Potassium 


 


Chloride  110.0 H


 


Carbon Dioxide 


 


Anion Gap 


 


BUN 


 


Creatinine 


 


Est GFR ( Amer) 


 


Est GFR (Non-Af Amer) 


 


POC Glucose (mg/dL) 


 


Random Glucose 


 


Calcium 


 


Total Bilirubin 


 


AST 


 


ALT 


 


Alkaline Phosphatase 


 


Total Protein 


 


Albumin 


 


Globulin 


 


Albumin/Globulin Ratio 


 


Arterial Blood Potassium  3.0 L














Assessment & Plan





- Assessment and Plan (Free Text)


Assessment: 





Patient is a 41yo female with PMHx significant for asthma and IBS who presented 

to the ED from a cruise ship for nausea, vomiting and abdominal pain


-Abdominal pain, resolved


Plan: 





-Patient's abdominal pain has resolved at time of examination


-She is requesting diet order - will give regular diet now


-CT and lab work reviewed - unremarkable per my interpretation


-OK for D/C from GI standpoint with outpatient follow up as needed with patient'

s GI in [place]





- Date & Time


Date: 06/24/18


Time: 07:00





<ViequesBeau - Last Filed: 06/24/18 18:42>





Meds





- Medications


Medications: 


 Current Medications





Sodium Chloride (Sodium Chloride 0.45%)  1,000 mls @ 80 mls/hr IV .[id number] [facility]


   Last Admin: 06/24/18 18:17 Dose:  80 mls/hr


Ketorolac Tromethamine (Toradol)  30 mg IVP Q6 PRN


   PRN Reason: Pain, moderate (4-7)


   Last Admin: 06/24/18 16:03 Dose:  30 mg


Levalbuterol HCl (Xopenex)  0.63 mg IH [id number] [facility]


   Last Admin: 06/24/18 11:03 Dose:  0.63 mg


Lorazepam (Ativan)  0.5 mg PO Q8 PRN; Protocol


   PRN Reason: Anxiety


   Last Admin: 06/24/18 13:24 Dose:  0.5 mg


Methylprednisolone (Solu-Medrol)  40 mg IVP Q8 [facility]


   Last Admin: 06/24/18 14:31 Dose:  40 mg


Morphine Sulfate (Morphine)  1 mg IVP Q4H PRN


   PRN Reason: Pain, moderate (4-7)


   Last Admin: 06/24/18 15:00 Dose:  1 mg


Pantoprazole Sodium (Protonix Inj)  40 mg IVP DAILY [facility]


   Last Admin: 06/24/18 10:15 Dose:  40 mg











Results





- Vital Signs


Recent Vital Signs: 


 Last Vital Signs











Temp  98.4 F   06/24/18 06:00


 


Pulse  84   06/24/18 06:00


 


Resp  20   06/24/18 06:00


 


BP  113/64   06/24/18 06:00


 


Pulse Ox  96   06/24/18 06:00














- Labs


Result Diagrams: 


 06/24/18 06:00





 06/24/18 06:00





Attending/Attestation





- Attestation


I have personally seen and examined this patient.: Yes


I have fully participated in the care of the patient.: Yes


I have reviewed all pertinent clinical information: Yes


Notes (Text): 





06/24/18 18:41


42 year old female who presents with nausea, vomiting and abdominal pain, now 

resolved. CT unremarkable. Continue PPI. diet as tolerated. Will sign off.

## 2018-06-24 NOTE — CT
PROCEDURE:  CT Abdomen and Pelvis with contrast



HISTORY:

Abdominal pain, n/v/d



COMPARISON:

None.



TECHNIQUE:

Contiguous helical/ transaxial sections of the and pelvis performed 

following injection of approximately 95 cc Omnipaque 350 contrast 

material.  Additional 2D sagittal and coronal reformats provided.



Radiation dose:



Total exam DLP = 1173.16 mGy-cm.



This CT exam was performed using one or more of the following dose 

reduction techniques: Automated exposure control, adjustment of the 

mA and/or kV according to patient size, and/or use of iterative 

reconstruction technique.



FINDINGS:



LOWER THORAX:

Mild atelectasis/ scarring changes left lingular region. Lung bases 

otherwise clear. Heart size upper limits of normal. No significant 

pericardial effusion.  Tiny hiatal hernia. 



LIVER:

Liver is enlarged measuring over 20 cm in CC dimension.  No obvious 

hepatic mass collection or calcification. Minimal fatty hepatic 

infiltration. 



GALLBLADDER AND BILE DUCTS:

Gallbladder physiologically distended.  No evidence of intraluminal 

gallbladder calculi. 



PANCREAS:

Unremarkable. No gross lesion or ductal dilatation.



SPLEEN:

Unremarkable. 



ADRENALS:

No adrenal lesions. 



KIDNEYS AND URETERS:

Kidneys demonstrate symmetric nephrograms.  No evidence of 

nephrolithiasis or hydronephrosis. 



VASCULATURE:

Unremarkable. No aortic aneurysm. 



BOWEL:

Unremarkable. No obstruction. No gross mural thickening. 



APPENDIX:

Normal appendix 



PERITONEUM:

Unremarkable. No free fluid. No free air. There is mild diastases of 

the mid anterior abdominal wall associated with a small fat 

containing umbilical hernia. 



LYMPH NODES:

Unremarkable. No enlarged lymph nodes. 



BLADDER:

Mild bladder wall thickening likely due to incomplete urinary bladder 

distention however Clinical correlation to rule out cystitis. 



REPRODUCTIVE:

Uterus exhibits somewhat lobular contour with what appears represent 

small hyperdense fibroid along the left fundal/body junction and 

another in the right lower uterine segment. The cervix is somewhat 

bulbous in contour is well. 



Follow-up pelvic ultrasound recommended for further evaluation. 



BONES:

Minor arm multilevel degenerative spondylosis.  No acute compression 

fractures. 



OTHER FINDINGS:

None.



IMPRESSION:

Mild hepatomegaly. 



Suspect uterine fibroids with slight bulbous appearance of the 

cervix.  Recommend followup pelvic ultrasound. 



Mild diastases of the mid anterior abdominal wall associated with a 

small fat containing umbilical hernia.

## 2018-06-25 LAB
ARTERIAL BLOOD GAS HEMOGLOBIN: 9.8 G/DL (ref 11.7–17.4)
ARTERIAL BLOOD GAS O2 SAT: 95.9 % (ref 95–98)
ARTERIAL BLOOD GAS PCO2: 41 MM/HG (ref 35–45)
ARTERIAL BLOOD GAS TCO2: 27.9 MMOL.L (ref 22–28)
HCO3 BLDA-SCNC: 26.6 MMOL/L (ref 21–28)
INHALED O2 CONCENTRATION: 21 %
O2 CAP BLDA-SCNC: 13.6 ML/DL (ref 16–24)
O2 CT BLDA-SCNC: 13 ML/DL (ref 15–23)
PH BLDA: 7.42 [PH] (ref 7.35–7.45)
PO2 BLDA: 65 MM/HG (ref 80–100)

## 2018-06-25 RX ADMIN — LEVALBUTEROL SCH MG: 0.63 SOLUTION RESPIRATORY (INHALATION) at 01:15

## 2018-06-25 RX ADMIN — LEVALBUTEROL SCH MG: 0.63 SOLUTION RESPIRATORY (INHALATION) at 15:50

## 2018-06-25 RX ADMIN — LEVALBUTEROL SCH MG: 0.63 SOLUTION RESPIRATORY (INHALATION) at 21:55

## 2018-06-25 RX ADMIN — LEVALBUTEROL SCH MG: 0.63 SOLUTION RESPIRATORY (INHALATION) at 11:59

## 2018-06-25 RX ADMIN — LEVALBUTEROL SCH MG: 0.63 SOLUTION RESPIRATORY (INHALATION) at 04:40

## 2018-06-25 RX ADMIN — LEVALBUTEROL SCH MG: 0.63 SOLUTION RESPIRATORY (INHALATION) at 07:07

## 2018-06-25 RX ADMIN — METHYLPREDNISOLONE SODIUM SUCCINATE SCH: 40 INJECTION, POWDER, FOR SOLUTION INTRAMUSCULAR; INTRAVENOUS at 09:30

## 2018-06-25 RX ADMIN — METHYLPREDNISOLONE SODIUM SUCCINATE SCH MG: 40 INJECTION, POWDER, FOR SOLUTION INTRAMUSCULAR; INTRAVENOUS at 06:16

## 2018-06-25 RX ADMIN — METHYLPREDNISOLONE SODIUM SUCCINATE SCH MG: 40 INJECTION, POWDER, FOR SOLUTION INTRAMUSCULAR; INTRAVENOUS at 21:07

## 2018-06-25 NOTE — CON
DATE:  06/25/2018



PULMONARY CONSULTATION



REASON FOR CONSULTATION:  Asthma.



REFERRING PHYSICIAN:  Dr. Navneet Castaneda.



History is obtained via extensive discussion with the night nurse.  I have

also reviewed the chart at length, and discussed the case with the patient at

length.



The patient is a 42-year-old female, with past medical history significant

for asthma, irritable bowel syndrome who presents to [facility]

with a 2-day history of worsening nausea, vomiting, diarrhea and abdominal

pain.  The patient denies blood in her stool.  However, she did complain of

significant fatigue in the Emergency Room.  She was thus admitted for

additional evaluation.



Again, I did discuss the case with the night nurse at length.  Apparently,

yesterday, the patient did experience shortness of breath and wheezing.  A

rapid response was called.  I did review those notes.  The patient was

placed on frequent nebulizer treatments and intravenous steroids.  This

morning, she states to feeling significantly better, and is not short of breath.



There is no history of cough or sputum production.  There is no history of

chest pain, coughing up of blood or chest pain - made worse with deep

respirations.  The patient did present to [facility] with

low-grade fevers.  No history of chills or infectious exposure.  No history

of night sweats, weight loss or appetite change prior to the above events. 

No history of leg or calf pains.  No history of syncope or diaphoresis.  No

history of recent travel or trauma.



REVIEW OF SYSTEMS:  No acute urinary symptoms.  No new musculoskeletal

complaints.  Rest of the review of systems is negative.



ALLERGIES:  NO KNOWN ALLERGIES.



SOCIAL HISTORY:  Negative for tobacco, negative for alcohol.



FAMILY HISTORY:  No inheritable diseases.



HOME MEDICATIONS:  Include Bentyl, albuterol HFA and Protonix.



PHYSICAL EXAMINATION:

GENERAL:  The patient appears quite comfortable this morning.  She is not

short of breath at rest.

VITAL SIGNS:  Temperature is 98.4, pulse 84, respirations 18, blood

pressure 113/64.  Oxygen saturation on room air is 97%.

HEENT:  Normocephalic, atraumatic.  No JVD.

CARDIOVASCULAR:  Positive S1, S2.  No S3 gallop.

LUNGS:  Clear bilaterally this morning.

EXTREMITIES:  No clubbing, cyanosis or edema.  Calves are nontender to

palpation.

GI:  Abdomen is soft, nontender and nondistended.  Bowel sounds are

positive.

SKIN:  No acute rash.

NEUROLOGIC:  Exam limited at the present time.



PERTINENT LABORATORY DATA:  CAT scan of the chest was done as an angiogram

protocol.  There is no pulmonary embolism seen.  There are no acute

consolidations or nodules seen.  There is no lymphadenopathy.  Arterial

blood gas was also done yesterday on nasal cannula.  Results are:  The pH

7.25, pCO2 of 57 and pO2 of 82.  CBC:  White count 7.2K, hemoglobin 9.5,

hematocrit 31.1, platelets of 327,000.



IMPRESSION:

1.  Rule out gastroenteritis.

2.  History of irritable bowel syndrome.

3.  Asthma.

4.  Acute bronchospasm - resolving.

5.  Acute respiratory insufficiency.

6.  Mild anemia.



PLAN:  Again, I did discuss the case with the night nurse at length.  I

have also reviewed the chart at length, and discussed the case with the

patient at length.



The patient presents to [facility] with a two-day history of

worsening abdominal pain, along with nausea, vomiting and diarrhea.  As

above, in addition, she was experiencing significant fatigue.  She was thus

admitted for additional evaluation and treatment.  Apparently, yesterday

afternoon, the patient did experience shortness of breath and wheezing. 

The patient was placed on nebulizer treatments and intravenous steroids. 

She is significantly improved this morning.  The night nurse also confirms 

the patient's significant improvement.  On physical exam this morning, the

patient's lungs are clear.  Oxygen saturation on room air is 97%.  The

patient's arterial blood gas from yesterday is noted above.  I will

repeat an arterial blood gas this morning.  The patient also had a CT scan

of the chest - done as a angiogram protocol.  There is no pulmonary

embolism noted.  There are no acute significant abnormalities noted.  The

patient also states there is significant decrease in her abdominal pain, as well

as a significant decrease in her gastrointestinal symptoms.  She is hungry

this morning and asking for food.  GI evaluation is ongoing.  Input is

noted.



The clinical status of the patient is significantly improved - compared to

yesterday.  I will continue with the current nebulizer treatments and

decrease the intravenous steroids this morning.  I will discuss the above

with Dr. Castaneda this morning.



Thank you very much for this pulmonary consultation.







__________________________________________

Denver Gomez MD



DD:  06/25/2018 6:53:43

DT:  06/25/2018 6:57:11

Job # [id number]



MTDD

## 2018-06-25 NOTE — CT
PROCEDURE:  CT Chest with contrast (Pulmonary Angiogram)



HISTORY:

Elevated D Dimer



COMPARISON:

None available. 



TECHNIQUE:

Axial computed tomography images were obtained of the chest in the 

pulmonary arterial phase of enhancement. Coronal and sagittal 

reformatted images were created and reviewed.



Intravenous contrast dose: 



Radiation dose:



Total exam DLP =  mGy-cm.



This CT exam was performed using one or more of the following dose 

reduction techniques: Automated exposure control, adjustment of the 

mA and/or kV according to patient size, and/or use of iterative 

reconstruction technique.



FINDINGS:



PULMONARY ARTERIES:

Unremarkable. No pulmonary embolism. 



AORTA:

No acute findings. No thoracic aortic aneurysm. 



LUNGS:

Unremarkable. No nodule, mass or pulmonary consolidation. 



PLEURAL SPACES:

Unremarkable. No effusion or pneuomothorax. 



HEART:

Unremarkable. No cardiomegaly. No significant pericardial effusion. 



LYMPH NODES:

No lymphadenopathy.



BONES, CHEST WALL:

Unremarkable. No fracture or destructive lesion 



OTHER FINDINGS:

Unremarkable. 



IMPRESSION:

Unremarkable CT pulmonary angiogram. No pulmonary embolus.

## 2018-06-25 NOTE — CP.PCM.PN
<NishaPelon - Last Filed: 06/25/18 12:40>





Subjective





- Date & Time of Evaluation


Date of Evaluation: 06/25/18


Time of Evaluation: 12:00





- Subjective


Subjective: 





PGY5 GI Fellow Progress Note


Patient seen and examined bedside this afternoon. After our evaluation yesterday

, patient developed SOB and felt very anxious, may have had panic attack. She 

was started on breathing treatment and steroid treatment. She had nausea last 

night and vomited once. Patient states she is feeling very well and is eager to 

go home. She is also eager to eat more solid food. Denies any nausea at this 

time.





12 system ROS performed and negative except where stated.





Objective





- Vital Signs/Intake and Output


Vital Signs (last 24 hours): 


 











Temp Pulse Resp BP Pulse Ox


 


 98.4 F   64   20   123/63   97 


 


 06/25/18 06:00  06/25/18 06:00  06/25/18 06:00  06/25/18 06:00  06/25/18 06:00








Intake and Output: 


 











 06/25/18 06/25/18





 06:59 18:59


 


Intake Total 0 


 


Balance 0 














- Medications


Medications: 


 Current Medications





Sodium Chloride (Sodium Chloride 0.45%)  1,000 mls @ 80 mls/hr IV .[id number] [facility]


   Last Admin: 06/25/18 09:35 Dose:  80 mls/hr


Ketorolac Tromethamine (Toradol)  30 mg IVP Q6 PRN


   PRN Reason: Pain, moderate (4-7)


   Last Admin: 06/24/18 16:03 Dose:  30 mg


Levalbuterol HCl (Xopenex)  0.63 mg IH [id number] [facility]


   Last Admin: 06/25/18 11:59 Dose:  0.63 mg


Lorazepam (Ativan)  0.5 mg PO Q8 PRN; Protocol


   PRN Reason: Anxiety


   Last Admin: 06/25/18 00:04 Dose:  0.5 mg


Methylprednisolone (Solu-Medrol)  30 mg IVP Q12 [facility]


   Last Admin: 06/25/18 09:30 Dose:  Not Given


Pantoprazole Sodium (Protonix Inj)  40 mg IVP DAILY [facility]


   Last Admin: 06/24/18 10:15 Dose:  40 mg











- Labs


Labs: 


 











PT  12.4 SECONDS (9.4-12.5)   06/23/18  18:10    


 


INR  1.09  (0.93-1.08)  H  06/23/18  18:10    


 


APTT  26.9 Seconds (25.1-36.5)   06/23/18  18:10    














- Constitutional


Appears: Non-toxic, No Acute Distress





- Eye Exam


Eye Exam: EOMI, PERRL





- ENT Exam


ENT Exam: Mucous Membranes Moist





- Respiratory Exam


Respiratory Exam: Wheezes.  absent: Clear to Ausculation Bilateral, Rales, 

Rhonchi





- Cardiovascular Exam


Cardiovascular Exam: RRR, +S1, +S2





- GI/Abdominal Exam


GI & Abdominal Exam: Soft, Normal Bowel Sounds.  absent: Distended, Firm, 

Guarding, Rigid, Tenderness, Organomegaly





- Extremities Exam


Extremities Exam: Normal Inspection.  absent: Pedal Edema





- Neurological Exam


Neurological Exam: Alert, Awake, Oriented x3





- Psychiatric Exam


Psychiatric exam: Normal Affect, Normal Mood





- Skin


Skin Exam: Dry, Warm





Assessment and Plan





- Assessment and Plan (Free Text)


Assessment: 





Patient is a 41yo female with PMHx significant for asthma and IBS who presented 

[place] the ED [place] a cruise ship for nausea, vomiting and abdominal pain


-Abdominal pain and nausea/vomiting


Plan: 


-Patient's abdominal pain has resolved at time of examination and she is eager 

to eat


-Recommend advancing diet to regular diet - bland food, no citrus


-OK for D/C from GI standpoint with outpatient follow up as needed with patient'

s GI in [place]





<Raymond iNcole - Last Filed: 06/25/18 13:27>





Objective





- Vital Signs/Intake and Output


Vital Signs (last 24 hours): 


 











Temp Pulse Resp BP Pulse Ox


 


 98.4 F   64   20   123/63   97 


 


 06/25/18 06:00  06/25/18 06:00  06/25/18 06:00  06/25/18 06:00  06/25/18 06:00








Intake and Output: 


 











 06/25/18 06/25/18





 06:59 18:59


 


Intake Total 0 


 


Balance 0 














- Medications


Medications: 


 Current Medications





Sodium Chloride (Sodium Chloride 0.45%)  1,000 mls @ 80 mls/hr IV .[id number] [facility]


   Last Admin: 06/25/18 09:35 Dose:  80 mls/hr


Ketorolac Tromethamine (Toradol)  30 mg IVP Q6 PRN


   PRN Reason: Pain, moderate (4-7)


   Last Admin: 06/24/18 16:03 Dose:  30 mg


Levalbuterol HCl (Xopenex)  0.63 mg IH [id number] [facility]


   Last Admin: 06/25/18 11:59 Dose:  0.63 mg


Lorazepam (Ativan)  0.5 mg PO Q8 PRN; Protocol


   PRN Reason: Anxiety


   Last Admin: 06/25/18 00:04 Dose:  0.5 mg


Methylprednisolone (Solu-Medrol)  30 mg IVP Q12 [facility]


   Last Admin: 06/25/18 09:30 Dose:  Not Given


Pantoprazole Sodium (Protonix Inj)  40 mg IVP DAILY [facility]


   Last Admin: 06/25/18 09:30 Dose:  40 mg











- Labs


Labs: 


 











PT  12.4 SECONDS (9.4-12.5)   06/23/18  18:10    


 


INR  1.09  (0.93-1.08)  H  06/23/18  18:10    


 


APTT  26.9 Seconds (25.1-36.5)   06/23/18  18:10    














Attending/Attestation





- Attestation


I have personally seen and examined this patient.: Yes


I have fully participated in the care of the patient.: Yes


I have reviewed all pertinent clinical information, including history, physical 

exam and plan: Yes


Notes (Text): 





06/25/18 13:27


42 year old female who presents with nausea, vomiting and abdominal pain, now 

resolved. CT unremarkable. Continue PPI. diet as tolerated. Will sign off.

## 2018-06-25 NOTE — PN
DATE:  06/25/2018



I saw her sitting up in bed.  She has oxygen on.  She is getting DuoNebs. 

She is on IV fluids at 80 mL an hour.  She has got Ativan; morphine for

pain, which I will decrease; Protonix.  She is on Solu-Medrol 30 now,

Toradol and Xopenex.  She came in with a viral gastritis picture and within

24 hours after increasing her food to try and discharge her, she went in to

acute asthma, COPD, anxiety, pain, nausea and vomiting.  So here we are,

back to sitting in bed, feeling good again, but she is on n.p.o. with clear

fluids.  I am not going to increase the diet until GI states.  I will

decrease the pain medications, Solu-Medrol as per the pulmonologist.  She

has a 140 sodium, potassium 3.6, BUN 50, creatinine 0.7.  The last sugar

was 94.  Troponin was less than 0.01.  She has a 7.2 white count, 9.5

hemoglobin and 327 platelets.  She is having multiple issues now.  Also,

she is going through a whole lot of anxiety.  I am going to see what GI has

to say about her nausea and vomiting and get their opinion.  She was on a

cruise ship and she is here with nausea, vomiting, viral gastroenteritis,

acute COPD, asthma, anxiety.  We will see how she does, hopefully one more

day.





__________________________________________

Navneet Castaneda DO



DD:  06/25/2018 8:25:39

DT:  06/25/2018 8:26:58

Job # [id number]

## 2018-06-25 NOTE — PN
DATE:  06/24/2018



She had blood tests done, it was 9.2 white count, 10.3 hemoglobin, 32.7

hematocrit with 341 platelets.  She has a lactate of 0.9.  She had sodium

140, potassium 3.7, BUN 16, creatinine 0.7, calcium is 9, magnesium 2,

total bili is 0.3, AST is 27, ALT is 37, alk phos is 60.  Troponin I is

less than 0.01, total protein 7.5 and the urine was trace blood.  She had a

chest x-ray and had CAT scan of the abdomen and pelvis that was pending. 

So I saw her this morning.  This is the next day.  She is doing well,

sitting up in bed.  She is hungry.  No more nausea, vomiting.  No more

abdominal pain.  No more diarrhea.  She is in good spirits.



PHYSICAL EXAMINATION:

VITAL SIGNS:  She has 98.4 temperature, 84 pulse, 113/64 blood pressure, 20

respiratory rate, 96% O2 sat on room air.  HEENT:  Head is atraumatic,

normocephalic.

HEART:  Regular rate.

LUNGS:  Decreased breath sounds, but clear.

ABDOMEN:  Soft, obese, nontender.  Positive bowel sounds.  No guarding, no

rebound or CVA tenderness.

EXTREMITIES:  No edema.



A very good exam.  She is on Protonix IV and IV fluids.  I think it made a

big difference.  She had lab today, was at 7.2 white count, 9.5 hemoglobin,

31.1 hematocrit with 327 platelets.  She has 140 sodium, potassium 3.6, BUN

15, creatinine 0.7, GFR is greater than 60, sugar is 85, calcium is 8.3,

total bili is 0.3, AST is 25, ALT is 32, alk phos 46, total protein 6.5.



So, my plan is if the chest x-ray and abdominal CAT scan are negative,

which I assume they are from the ER doctor's notes, I will discharge her

later today after she eats breakfast.  She is now on a regular diet.  I

will await a call from the nursing later letting me know about the CAT

scan, chest x-ray results and also how she did eating.  If she is not

throwing up, I will discharge her later today and this is hopefully a

discharge summary on Sima Gurjit, who came in for viral gastroenteritis.





__________________________________________

Navneet Castaneda DO







DD:  06/24/2018 8:25:32

DT:  06/24/2018 8:27:15

[facility] # [id number]

## 2018-06-26 VITALS
TEMPERATURE: 98 F | HEART RATE: 98 BPM | DIASTOLIC BLOOD PRESSURE: 70 MMHG | SYSTOLIC BLOOD PRESSURE: 112 MMHG | OXYGEN SATURATION: 100 %

## 2018-06-26 LAB
ALBUMIN SERPL-MCNC: 3.5 G/DL (ref 3–4.8)
ALBUMIN/GLOB SERPL: 1.2 {RATIO} (ref 1.1–1.8)
ALT SERPL-CCNC: 32 U/L (ref 7–56)
AST SERPL-CCNC: 27 U/L (ref 14–36)
BUN SERPL-MCNC: 11 MG/DL (ref 7–21)
CALCIUM SERPL-MCNC: 8.5 MG/DL (ref 8.4–10.5)
ERYTHROCYTE [DISTWIDTH] IN BLOOD BY AUTOMATED COUNT: 16.1 % (ref 11.5–14.5)
GFR NON-AFRICAN AMERICAN: > 60
HGB BLD-MCNC: 9.5 G/DL (ref 12–16)
MCH RBC QN AUTO: 24.5 PG (ref 25–35)
MCHC RBC AUTO-ENTMCNC: 31.5 G/DL (ref 31–37)
MCV RBC AUTO: 77.8 FL (ref 80–105)
PLATELET # BLD: 304 10^3/UL (ref 120–450)
PMV BLD AUTO: 8.2 FL (ref 7–11)
RBC # BLD AUTO: 3.88 10^6/UL (ref 3.5–6.1)
WBC # BLD AUTO: 6.6 10^3/UL (ref 4.5–11)

## 2018-06-26 RX ADMIN — LEVALBUTEROL SCH MG: 0.63 SOLUTION RESPIRATORY (INHALATION) at 11:06

## 2018-06-26 RX ADMIN — LEVALBUTEROL SCH MG: 0.63 SOLUTION RESPIRATORY (INHALATION) at 04:40

## 2018-06-26 RX ADMIN — LEVALBUTEROL SCH MG: 0.63 SOLUTION RESPIRATORY (INHALATION) at 07:29

## 2018-06-26 RX ADMIN — LEVALBUTEROL SCH MG: 0.63 SOLUTION RESPIRATORY (INHALATION) at 00:25

## 2018-06-26 NOTE — CP.PCM.PN
Subjective





- Date & Time of Evaluation


Date of Evaluation: 06/26/18


Time of Evaluation: 01:00





- Subjective


Subjective: 





Patient seen for her c/o shortness of breath even after she was given a 

nebulizer Rx already ordered.


She denies any c/o chest pain,cough,nausea,vomiting or any other symptoms.


Patient was admitted for abdominal pain ,nausea,vomiting and weakness.


She is currently on solumedrol twice daily.


VS:BP 95/52  T 99.2  P 62  RR 20 O2 sat 90% on R Air








PMH: IBS,Asthma,Gerd





Objective





- Vital Signs/Intake and Output


Vital Signs (last 24 hours): 


 











Temp Pulse Resp BP Pulse Ox


 


 99.2 F   67   20   95/52 L  90 L


 


 06/26/18 01:07  06/26/18 01:07  06/26/18 01:07  06/26/18 01:07  06/26/18 01:07








Intake and Output: 


 











 06/25/18 06/26/18





 18:59 06:59


 


Intake Total 540 420


 


Balance 540 420














- Medications


Medications: 


 Current Medications





Sodium Chloride (Sodium Chloride 0.45%)  1,000 mls @ 80 mls/hr IV .[id number] [facility]


   Last Admin: 06/25/18 09:35 Dose:  80 mls/hr


Ketorolac Tromethamine (Toradol)  30 mg IVP Q6 PRN


   PRN Reason: Pain, moderate (4-7)


   Last Admin: 06/25/18 23:00 Dose:  30 mg


Levalbuterol HCl (Xopenex)  0.63 mg IH [id number] [facility]


   Last Admin: 06/26/18 00:25 Dose:  0.63 mg


Lorazepam (Ativan)  0.5 mg PO Q8 PRN; Protocol


   PRN Reason: Anxiety


   Last Admin: 06/25/18 00:04 Dose:  0.5 mg


Methylprednisolone (Solu-Medrol)  30 mg IVP Q12 [facility]


   Last Admin: 06/25/18 21:07 Dose:  30 mg


Pantoprazole Sodium (Protonix Inj)  40 mg IVP DAILY [facility]


   Last Admin: 06/25/18 09:30 Dose:  40 mg











- Labs


Labs: 


 











PT  12.4 SECONDS (9.4-12.5)   06/23/18  18:10    


 


INR  1.09  (0.93-1.08)  H  06/23/18  18:10    


 


APTT  26.9 Seconds (25.1-36.5)   06/23/18  18:10    














- Constitutional


Appears: No Acute Distress, Other (Pt is obese)





- Head Exam


Head Exam: ATRAUMATIC, NORMAL INSPECTION, NORMOCEPHALIC





- Eye Exam


Eye Exam: Normal appearance, PERRL





- ENT Exam


ENT Exam: Mucous Membranes Moist





- Neck Exam


Neck Exam: Normal Inspection





- Respiratory Exam


Respiratory Exam: Decreased Breath Sounds (Breath sounds are distant bilaterally

), NORMAL BREATHING PATTERN.  absent: Accessory Muscle Use





- Cardiovascular Exam


Cardiovascular Exam: REGULAR RHYTHM, +S1, +S2





- GI/Abdominal Exam


GI & Abdominal Exam: Soft, Normal Bowel Sounds.  absent: Tenderness





- Extremities Exam


Extremities Exam: Normal Inspection.  absent: Calf Tenderness, Pedal Edema





- Neurological Exam


Neurological Exam: Alert, Oriented x3





- Psychiatric Exam


Psychiatric exam: Anxious





- Skin


Skin Exam: Dry, Warm





Assessment and Plan





- Assessment and Plan (Free Text)


Assessment: 





Exacerbation of Asthma


Plan: 





Solumedrol 30 mg ordered  iv push stat.





At 2AM:


O2 sat improved to 98%.


Pt is sleeping comfortably

## 2018-06-26 NOTE — PN
DATE:  06/26/2018



PULMONARY NOTE



SUBJECTIVE:  The patient appears very comfortable this morning.  She is not

short of breath at rest.



PHYSICAL EXAMINATION:

VITAL SIGNS:  (Last noted in the computer):  Last temperature recorded is

99.2, pulse is 67, respiratory rate is approximately 18, last blood

pressure recorded 95/52.  Oxygen saturation on nasal cannula is 98%.

HEENT:  Normocephalic, atraumatic.  No JVD.

CARDIOVASCULAR:  Positive S1, S2.  No S3 gallop.

LUNGS:  Clear bilaterally.

EXTREMITIES:  No clubbing, cyanosis or edema.  Calves are nontender to

palpation.

GI:  Abdomen is soft, nontender and nondistended.  Bowel sounds are

positive.

SKIN:  No acute rash.

NEUROLOGIC:  Limited at the present time.



PERTINENT LABORATORY DATA:  Arterial blood gas was done on room air

yesterday.  Results are:  PH is 7.42, pCO2 of 41, pO2 of 65.



IMPRESSION:

1.  Rule out gastroenteritis.

2.  History of irritable bowel syndrome.

3.  Asthma.

4.  Acute bronchospasm - resolved.

5.  Acute respiratory insufficiency - resolved.

6.  Mild anemia.



PLAN:  The patient appears very comfortable this morning.  She is not short of

breath at rest.  She does state to feeling much much better overall, and is

asking to go home.  She offers no pulmonary complaints at the present time.

In addition, she states that her gastrointestinal symptoms are almost gone.



On physical exam, her lungs remain clear.  In addition, the

alveolar-arterial gradient is less.  I will continue with the current

nebulizer treatments and change to oral steroids this morning.  I did

review the arterial blood gas as above.  There is now normalization of the

pH and pCO2.  There was a mild increase in the alveolar-arterial gradient -

which again is resolving.



Clinical status of the patient is significantly improved overall.  Input by

GI is noted.  I will discuss the above with Dr. Castaneda this morning.





__________________________________________

Denver Gomez MD





DD:  06/26/2018 7:27:15

DT:  06/26/2018 7:29:38

Job # [id number]



[email or web address]

## 2018-06-26 NOTE — CON
DATE:  06/25/2018



HISTORY OF PRESENT ILLNESS:  The patient is a 42-year-old -American

female with not known previous psychiatric history, self-reported history

of anxiety.  The patient reported that she was on cruise, the patient

started to have generalized fatigue, also has abdominal pain.  The patient

reported that whenever she feels sick from her irritable bowel syndrome,

she would feels very anxious.  Psych consult was called for evaluation of

anxiety.  The patient was seen and examined today.  The patient presented

calm, cooperative.  The patient reported that she feels 100% better and she

reported that she is at her baseline.  The patient reported that whenever

she feels sick from her medical issues, she would start experience feeling

of anxiety, restlessness, and the patient needs to walk in order to feel

better.  The patient denied history of being depressed.  Denied history of

suicidal attempts.  Denied history of being admitted to the Psychiatric

Inpatient Unit.  The patient reported no signs of psychosis.  The patient

denied thoughts of harming herself or others.



Vital signs are stable.  Temperature 98.5, pulse is 100, blood pressure

116/69, respirations 20, and saturation is 100.



MEDICATIONS:  Reviewed.  The patient is on Toradol, Zenapax, Ativan 0.5 mg

every 8 hours p.r.n. for anxiety, Solu-Medrol, Protonix, and sodium

chloride.



LABORATORY DATA:  Reviewed.  Toxicology was positive for opioids as well as

cocaine, reports also reviewed.  Chest CT scan was done on 06/24/2018,

unremarkable CT pulmonary angiogram, no pulmonary embolism. 

Electrocardiogram yesterday, sinus tachycardia, otherwise normal EKG. 

Chest x-ray was also done, no active disease.



MENTAL STATUS EXAMINATION:  The patient presented to be alert and oriented,

pleasant, cooperative.  Good eye contact.  Speech was somewhat

overproductive.  Mood described "I feel better."  Affect was reactive, mood

congruent.  Thought process was coherent and goal directed.  Thought

content, the patient denied visual, auditory, or tactile hallucinations. 

Denied paranoid ideation.  The patient denied thoughts of harming herself

or others.  Denied intent or plan.  Insight and judgment seem to be

improving.  Impulses are well controlled.



IMPRESSION:  Rule out anxiety due to general medical condition.  The

patient also was positive for cocaine and opioids.  It could contribute to

her presentation of anxiety.



PLAN:  Continue Ativan as needed.  The patient does not present to be

psychotic or disorganized or in danger to self or others.  Social work

evaluation because the patient reported that she lives in [place] and

she was wondering how she will go back to [place].  

evaluation recommended.  The patient is no imminent danger to self or

others.  The patient needs to follow up with outpatient provider in the

area where she lives.  This writer will sign off.  Should you have any

questions, give me a call back







__________________________________________

Justa Mobley MD



DD:  06/25/2018 17:41:12

DT:  06/25/2018 17:44:02

Job # [id number]